# Patient Record
(demographics unavailable — no encounter records)

---

## 2024-10-07 NOTE — HISTORY OF PRESENT ILLNESS
[FreeTextEntry1] : 48 year M referred for evaluation of low libido/ low measured serum testosterone.   Reduced sexual desire (libido): No Decreased spontaneous erections: No Erectile dysfunction: No Inability to father children (hx of low sperm count): No Breast discomfort/gynecomastia: yes, gynecomastia for years  Height loss, low-trauma fracture of history of known low BMD: No Hot flushes, sweats: No   Decreased energy: No depressed mood: No Poor concentration and memory: No Sleep disturbance, increased sleepiness: No Anemia: No Reduced muscle bulk and strength: No Increased body fat: No  History of the following:   Cryptorchidism: No Cancer chemotherapy: No Radiation to the testes: No Trauma, torsion, infectious orchitis: No Elevated prolactin known: No Severe obesity: No Prior anabolic steroid use: yes > 5 years ago, was told he had a low T at that time, affected fertility and patient stopped.  Use of opioids, glucocorticoids, or androgen-deprivation therapy: No Brain/pituitary tumors or infiltrative disease known: No Head trauma: No Pituitary surgery or radiation: No

## 2024-10-07 NOTE — PHYSICAL EXAM
[Alert] : alert [Well Nourished] : well nourished [No Acute Distress] : no acute distress [Well Developed] : well developed [Normal Sclera/Conjunctiva] : normal sclera/conjunctiva [EOMI] : extra ocular movement intact [No Proptosis] : no proptosis [Normal Oropharynx] : the oropharynx was normal [Supple] : the neck was supple [Thyroid Not Enlarged] : the thyroid was not enlarged [No Respiratory Distress] : no respiratory distress conjunctiva clear [No Accessory Muscle Use] : no accessory muscle use [Clear to Auscultation] : lungs were clear to auscultation bilaterally [Normal S1, S2] : normal S1 and S2 [Normal Rate] : heart rate was normal [Regular Rhythm] : with a regular rhythm [No Edema] : no peripheral edema [Pedal Pulses Normal] : the pedal pulses are present [Normal Bowel Sounds] : normal bowel sounds [Not Tender] : non-tender [Not Distended] : not distended [Soft] : abdomen soft [Normal Anterior Cervical Nodes] : no anterior cervical lymphadenopathy [Normal Posterior Cervical Nodes] : no posterior cervical lymphadenopathy [No Spinal Tenderness] : no spinal tenderness [Spine Straight] : spine straight [No Stigmata of Cushings Syndrome] : no stigmata of Cushings Syndrome [Normal Gait] : normal gait [Normal Strength/Tone] : muscle strength and tone were normal [No Rash] : no rash [Normal Reflexes] : deep tendon reflexes were 2+ and symmetric [No Tremors] : no tremors [Oriented x3] : oriented to person, place, and time [Healthy Appearance] : healthy appearance [Acanthosis Nigricans] : no acanthosis nigricans

## 2024-10-14 NOTE — HISTORY OF PRESENT ILLNESS
[FreeTextEntry1] : This is a 48 year y/o male with a PMHx of MVP, hyperlipidemia, lyme disease presents today for follow up. Pt was last in office in July with c/o intermittent chest discomfort. He is s/p positive STR and was sent for CTCA with normal coronary arteries. Pt also c/o numbness and tingling, s/p arterial dopplers with no evidence significant atherosclerosis seen in b/l lower extremities and s/p normal carotid duplex, still with numbness has not seen neuro. Patient denies chest pain, dyspnea, palpitations, dizziness, syncope, changes in bowel/bladder habits or appetite.

## 2024-10-24 NOTE — PROCEDURE
[de-identified] : Injection: Bilateral knee joint. Indication: Osteoarthritis.  A discussion was had with the patient regarding this procedure and all questions were answered. All risks, benefits and alternatives were discussed. These included but were not limited to bleeding, infection, and allergic reaction. Alcohol was used to clean the skin, and betadine was used to sterilize and prep the area in the anteromedial aspect of the knee. Ethyl chloride spray was then used as a topical anesthetic. A 22-gauge needle was used to inject 2 cc of Euflexxa into the knee with ease. A sterile bandage was then applied. The patient tolerated the procedure well and there were no complications.   Lot #: [id number] Exp: August 2025  The first of three Euflexxa injections was given today under sterile conditions into the bilateral knee joint without complication (see procedure note). I again discussed the role of activity modification/icing following the injection to treat any local irritation from the injection.   I will have the patient followup for the next injection in approximately 1 week.

## 2024-10-30 NOTE — PROCEDURE
[de-identified] : Injection: Bilateral knee joint. Indication: Osteoarthritis.  A discussion was had with the patient regarding this procedure and all questions were answered. All risks, benefits and alternatives were discussed. These included but were not limited to bleeding, infection, and allergic reaction. Alcohol was used to clean the skin, and betadine was used to sterilize and prep the area in the anteromedial aspect of the knee. Ethyl chloride spray was then used as a topical anesthetic. A 22-gauge needle was used to inject 2 cc of Euflexxa into the knee with ease. A sterile bandage was then applied. The patient tolerated the procedure well and there were no complications.   Lot #: [id number] Exp: August 2025  The 2 of three Euflexxa injections was given today under sterile conditions into the bilateral knee joint without complication (see procedure note). I again discussed the role of activity modification/icing following the injection to treat any local irritation from the injection.   I will have the patient followup for the next injection in approximately 1 week.

## 2024-11-06 NOTE — PROCEDURE
[de-identified] : Injection: Bilateral knee joint. Indication: Osteoarthritis.  A discussion was had with the patient regarding this procedure and all questions were answered. All risks, benefits and alternatives were discussed. These included but were not limited to bleeding, infection, and allergic reaction. Alcohol was used to clean the skin, and betadine was used to sterilize and prep the area in the anteromedial aspect of the knee. Ethyl chloride spray was then used as a topical anesthetic. A 22-gauge needle was used to inject 2 cc of Euflexxa into the knee with ease. A sterile bandage was then applied. The patient tolerated the procedure well and there were no complications.   Lot #: [id number] Exp: August 2025  The 3 of three Euflexxa injections was given today under sterile conditions into the bilateral knee joint without complication (see procedure note). I again discussed the role of activity modification/icing following the injection to treat any local irritation from the injection.   I will have the patient followup for the next injection in approximately 1 week.

## 2025-02-21 NOTE — HEALTH RISK ASSESSMENT
[0] : 2) Feeling down, depressed, or hopeless: Not at all (0) [PHQ-2 Negative - No further assessment needed] : PHQ-2 Negative - No further assessment needed [Former] : Former [[id number]] : 0

## 2025-02-21 NOTE — HEALTH RISK ASSESSMENT
[0] : 2) Feeling down, depressed, or hopeless: Not at all (0) [PHQ-2 Negative - No further assessment needed] : PHQ-2 Negative - No further assessment needed [Former] : Former [[id number]] : 0

## 2025-02-21 NOTE — HISTORY OF PRESENT ILLNESS
[FreeTextEntry1] : diarrhea  [de-identified] : This is a 50 y/o male with a pmhx of MVP, hyperlipidemia, lyme disease presents today for diarrhea. Pt saw Dr. Gu and has been off of Magnesium, says he is still getting diarrhea 2-3 times a day for 6 months now.  Denies any bloody stools. Pt is unsure if it's toprol is making him having diarrhea and wants to know if he can wean off from it or change it to something else, was told he could wean last week but he did not start to wean it. Denies abd pain, fever, vomiting. Denies chest pain, SOB, RETANA, dizziness, diaphoresis, palpitations, LE swelling, orthopnea, syncope, n/v, headache.

## 2025-02-21 NOTE — ADDENDUM
[FreeTextEntry1] : This note was written by Joslyn Simon on 02/20/2025 acting as medical scribe for Dr. Tyler Toussaint. I, Dr. Tyler Toussaint, have read and attest that all the information, medical decision making and discharge instructions within are true and accurate.

## 2025-02-21 NOTE — HISTORY OF PRESENT ILLNESS
[FreeTextEntry1] : diarrhea  [de-identified] : This is a 50 y/o male with a pmhx of MVP, hyperlipidemia, lyme disease presents today for diarrhea. Pt saw Dr. Gu and has been off of Magnesium, says he is still getting diarrhea 2-3 times a day for 6 months now.  Denies any bloody stools. Pt is unsure if it's toprol is making him having diarrhea and wants to know if he can wean off from it or change it to something else, was told he could wean last week but he did not start to wean it. Denies abd pain, fever, vomiting. Denies chest pain, SOB, RETANA, dizziness, diaphoresis, palpitations, LE swelling, orthopnea, syncope, n/v, headache.

## 2025-02-28 NOTE — HISTORY OF PRESENT ILLNESS
[FreeTextEntry1] : 49 year M referred for evaluation of low libido/ low measured serum testosterone.   Patient sent for testosterone level that was rechecked again at around 5 pm which was low  Patient seen here in october 2024 for similar situation where testosterone was not checked at the recommended time (am).   In october 2024 testosterone was rechecked at the recommended time with normal total and free testosterone levels. Normal testosterone by [facility].     Reduced sexual desire (libido): No Decreased spontaneous erections: No Erectile dysfunction: No Inability to father children (hx of low sperm count): No Breast discomfort/gynecomastia: yes, gynecomastia for years  Height loss, low-trauma fracture of history of known low BMD: No Hot flushes, sweats: No   Decreased energy: No depressed mood: No Poor concentration and memory: No Sleep disturbance, increased sleepiness: No Anemia: No Reduced muscle bulk and strength: No Increased body fat: No  History of the following:   Cryptorchidism: No Cancer chemotherapy: No Radiation to the testes: No Trauma, torsion, infectious orchitis: No Elevated prolactin known: No Severe obesity: No Prior anabolic steroid use: yes > 5 years ago, was told he had a low T at that time, affected fertility and patient stopped.  Use of opioids, glucocorticoids, or androgen-deprivation therapy: No Brain/pituitary tumors or infiltrative disease known: No Head trauma: No Pituitary surgery or radiation: No

## 2025-02-28 NOTE — PHYSICAL EXAM
[Alert] : alert [Well Nourished] : well nourished [Healthy Appearance] : healthy appearance [No Acute Distress] : no acute distress [Well Developed] : well developed [Normal Sclera/Conjunctiva] : normal sclera/conjunctiva [EOMI] : extra ocular movement intact [No Proptosis] : no proptosis [Normal Oropharynx] : the oropharynx was normal [Supple] : the neck was supple [Thyroid Not Enlarged] : the thyroid was not enlarged [No Respiratory Distress] : no respiratory distress [No Accessory Muscle Use] : no accessory muscle use [Clear to Auscultation] : lungs were clear to auscultation bilaterally [Normal S1, S2] : normal S1 and S2 [Normal Rate] : heart rate was normal [Regular Rhythm] : with a regular rhythm [No Edema] : no peripheral edema [Pedal Pulses Normal] : the pedal pulses are present [Normal Bowel Sounds] : normal bowel sounds [Not Tender] : non-tender [Not Distended] : not distended [Soft] : abdomen soft [Normal Anterior Cervical Nodes] : no anterior cervical lymphadenopathy [No Spinal Tenderness] : no spinal tenderness [Spine Straight] : spine straight [No Stigmata of Cushings Syndrome] : no stigmata of Cushings Syndrome [Normal Gait] : normal gait [Normal Strength/Tone] : muscle strength and tone were normal [No Rash] : no rash [Normal Reflexes] : deep tendon reflexes were 2+ and symmetric [No Tremors] : no tremors [Oriented x3] : oriented to person, place, and time [Acanthosis Nigricans] : no acanthosis nigricans

## 2025-06-23 NOTE — HISTORY OF PRESENT ILLNESS
[FreeTextEntry1] : ALBER is a 49 year M w/MHx of MVP, HLD, Lyme disease who presents for follow up. Last OV 2/11/2025. Did have c/o diarrhea, held Metoprolol and Magnesium, did resolve, patient restarted said medications. N/T resolved. Still w/ knee pain. Denies chest pain, palpitations, diaphoresis, vision changes, HA, dizziness, syncope, cough, wheezing, SOB/RETANA, edema, fever, chills, infection/UTI SXS. Notes some fatigue.

## 2025-06-27 NOTE — DISCUSSION/SUMMARY
[de-identified] : This patient has severe bilateral knee osteoarthritis.  An extensive discussion was conducted on the natural history of the disease and the variety of surgical and non-surgical options available to the patient including, but not limited to non-steroidal anti-inflammatory medications, steroid injections, physical therapy, maintenance of ideal body weight, and reduction of activity.  I recommend meloxicam for this diagnosis however he prefers to take over-the-counter NSAIDs as needed pain.  Today we performed bilateral knee intra-articular cortisone injections. I recommend PT but he wants to try HEP.  I will also apply to his insurance for proposition for hyaluronic acid injection series for the bilateral knees. The patient will schedule an appointment as needed.  Informed consent for bilateral knee injections was obtained. All risks, benefits and alternatives were discussed. These included but were not limited to bleeding, infection, and allergic reaction.  All questions were answered. A time out was performed. The bilateral knees were prepped and draped in sterile fashion. Using sterile technique, the bilateral knees were each injected with 40mg of Kenalog, 4cc of 1% lidocaine, 4cc of 0.25% marcaine using a 21-gauge needle. A sterile dressing was applied. Post injection instructions were reviewed. The patient tolerated the procedure well.

## 2025-06-27 NOTE — PHYSICAL EXAM
[de-identified] : Patient is well nourished, well-developed, in no acute distress, with appropriate mood and affect. The patient is oriented to time, place, and person. Respirations are even and unlabored. Gait evaluation does reveal a limp. There is no inguinal adenopathy.  The right limb is well-perfused and showed 2+ dp/pt pulses, without skin lesions, shows a grossly normal motor and sensory examination. Examination of the hip shows no skin lesions. Hip motion is full and painless from 0-90 degrees extension to flexion, 20 degrees adduction and 20 degrees abduction, and 15 degrees internal and 30 degrees external rotation. FADIR is negative and EILEEN is negative. Stinchfield test is negative. The left limb is well-perfused and showed 2+ dp/pt pulses, without skin lesions, shows a grossly normal motor and sensory examination. Examination of the hip shows no skin lesions. Hip motion is full and painless from 0-90 degrees extension to flexion, 20 degrees adduction and 20 degrees abduction, and 15 degrees internal and 30 degrees external rotation. FADIR is negative and EILEEN is negative. Stinchfield test is negative.  Leg lengths are approximately equal. Both hips are stable and muscle strength is normal with good strength with resisted abduction and adduction. Pedal pulses are palpable. The right knee motion is significantly reduced and does cause significant pain. The right knee moves from 0 to 125 degrees. The knee is stable within that range-of-motion to AP and ML stress. The alignment of the knee is 10 degrees varus. Muscle strength is normal. Pedal pulses are palpable. The left knee motion is significantly reduced and does cause significant pain. The left knee moves from 0 to 125 degrees. The knee is stable within that range-of-motion to AP and ML stress. The alignment of the knee is 10 degrees varus. Muscle strength is normal. Pedal pulses are palpable.  Examination of the lumbar spine reveals full range of motion without pain. There is no tenderness to palpation of the osseous structures or paravertebral soft tissues. There is no muscle spasm. Straight leg raise is negative bilaterally. [de-identified] : Long standing knee, AP knee, lateral knee, and patellar views of the bilateral knee were ordered for knee pain and taken in the office and demonstrate degenerative joint disease of the knee with joint space narrowing, osteophyte formation, and subchondral sclerosis.

## 2025-06-27 NOTE — HISTORY OF PRESENT ILLNESS
[de-identified] : This is very nice 49-year-old gentleman experiencing bilateral knee pain, which is severe in intensity. Known bilateral knee osteoarthritis. The pain substantially limits activities of daily living. Walking tolerance is reduced. He does not take NSAIDs. CSI but HA did not.  He has not tried physical therapy.